# Patient Record
Sex: MALE | Race: BLACK OR AFRICAN AMERICAN | NOT HISPANIC OR LATINO | Employment: FULL TIME | URBAN - METROPOLITAN AREA
[De-identification: names, ages, dates, MRNs, and addresses within clinical notes are randomized per-mention and may not be internally consistent; named-entity substitution may affect disease eponyms.]

---

## 2022-09-09 ENCOUNTER — OFFICE VISIT (OUTPATIENT)
Dept: URGENT CARE | Facility: CLINIC | Age: 52
End: 2022-09-09
Payer: COMMERCIAL

## 2022-09-09 VITALS
HEART RATE: 82 BPM | TEMPERATURE: 98 F | SYSTOLIC BLOOD PRESSURE: 173 MMHG | WEIGHT: 208 LBS | DIASTOLIC BLOOD PRESSURE: 91 MMHG | OXYGEN SATURATION: 98 % | HEIGHT: 62 IN | BODY MASS INDEX: 38.28 KG/M2 | RESPIRATION RATE: 17 BRPM

## 2022-09-09 DIAGNOSIS — L25.9 CONTACT DERMATITIS, UNSPECIFIED CONTACT DERMATITIS TYPE, UNSPECIFIED TRIGGER: Primary | ICD-10-CM

## 2022-09-09 PROCEDURE — 3080F DIAST BP >= 90 MM HG: CPT | Mod: CPTII,,, | Performed by: FAMILY MEDICINE

## 2022-09-09 PROCEDURE — 3080F PR MOST RECENT DIASTOLIC BLOOD PRESSURE >= 90 MM HG: ICD-10-PCS | Mod: CPTII,,, | Performed by: FAMILY MEDICINE

## 2022-09-09 PROCEDURE — 3077F SYST BP >= 140 MM HG: CPT | Mod: CPTII,,, | Performed by: FAMILY MEDICINE

## 2022-09-09 PROCEDURE — 3008F PR BODY MASS INDEX (BMI) DOCUMENTED: ICD-10-PCS | Mod: CPTII,,, | Performed by: FAMILY MEDICINE

## 2022-09-09 PROCEDURE — 1159F MED LIST DOCD IN RCRD: CPT | Mod: CPTII,,, | Performed by: FAMILY MEDICINE

## 2022-09-09 PROCEDURE — 3008F BODY MASS INDEX DOCD: CPT | Mod: CPTII,,, | Performed by: FAMILY MEDICINE

## 2022-09-09 PROCEDURE — 99203 PR OFFICE/OUTPT VISIT, NEW, LEVL III, 30-44 MIN: ICD-10-PCS | Mod: ,,, | Performed by: FAMILY MEDICINE

## 2022-09-09 PROCEDURE — 1160F RVW MEDS BY RX/DR IN RCRD: CPT | Mod: CPTII,,, | Performed by: FAMILY MEDICINE

## 2022-09-09 PROCEDURE — 1160F PR REVIEW ALL MEDS BY PRESCRIBER/CLIN PHARMACIST DOCUMENTED: ICD-10-PCS | Mod: CPTII,,, | Performed by: FAMILY MEDICINE

## 2022-09-09 PROCEDURE — 3077F PR MOST RECENT SYSTOLIC BLOOD PRESSURE >= 140 MM HG: ICD-10-PCS | Mod: CPTII,,, | Performed by: FAMILY MEDICINE

## 2022-09-09 PROCEDURE — 1159F PR MEDICATION LIST DOCUMENTED IN MEDICAL RECORD: ICD-10-PCS | Mod: CPTII,,, | Performed by: FAMILY MEDICINE

## 2022-09-09 PROCEDURE — 99203 OFFICE O/P NEW LOW 30 MIN: CPT | Mod: ,,, | Performed by: FAMILY MEDICINE

## 2022-09-09 RX ORDER — HYDROCORTISONE 25 MG/G
CREAM TOPICAL
Qty: 20 G | Refills: 0 | Status: SHIPPED | OUTPATIENT
Start: 2022-09-09

## 2022-09-09 RX ORDER — TRIAMCINOLONE ACETONIDE 1 MG/G
CREAM TOPICAL 2 TIMES DAILY
Qty: 30 G | Refills: 0 | Status: SHIPPED | OUTPATIENT
Start: 2022-09-09 | End: 2022-09-16

## 2022-09-09 RX ORDER — FLUTICASONE FUROATE AND VILANTEROL TRIFENATATE 100; 25 UG/1; UG/1
POWDER RESPIRATORY (INHALATION)
COMMUNITY
Start: 2022-09-02

## 2022-09-09 RX ORDER — AMLODIPINE BESYLATE 5 MG/1
5 TABLET ORAL DAILY
COMMUNITY
Start: 2022-08-09

## 2022-09-09 RX ORDER — ALBUTEROL SULFATE 90 UG/1
AEROSOL, METERED RESPIRATORY (INHALATION)
COMMUNITY
Start: 2022-09-07

## 2022-09-09 RX ORDER — HYDROXYZINE HYDROCHLORIDE 25 MG/1
25 TABLET, FILM COATED ORAL 4 TIMES DAILY PRN
Qty: 20 TABLET | Refills: 0 | Status: SHIPPED | OUTPATIENT
Start: 2022-09-09 | End: 2022-09-14

## 2022-09-09 NOTE — PROGRESS NOTES
"Subjective:       Patient ID: Jose Eduardo Stern is a 52 y.o. male.    Vitals:  height is 5' 2" (1.575 m) and weight is 94.3 kg (208 lb). His oral temperature is 98.2 °F (36.8 °C). His blood pressure is 173/91 (abnormal) and his pulse is 82. His respiration is 17 and oxygen saturation is 98%.     Chief Complaint: Poison Ivy (Possible* x 2 days ago )    52 y.o. presents to clinic stating he has rashes on face and arms. 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 OTC ointment used worsen .  Is diabetic.  Last A1c was over 8.  A blood glucose was checked it was 245.  Explained to patient that his blood sugars too high for any oral steroid use or steroid injection.    Constitution: Negative.   HENT: Negative.     Neck: neck negative.   Cardiovascular: Negative.    Eyes: Negative.    Respiratory: Negative.  Negative for chest tightness, shortness of breath and wheezing.    Gastrointestinal: Negative.    Genitourinary: Negative.    Musculoskeletal: Negative.    Skin:  Positive for rash.   Allergic/Immunologic: Negative.    Neurological: Negative.    Hematologic/Lymphatic: Negative.      Objective:      Physical Exam   Constitutional:  Non-toxic appearance. He does not appear ill. No distress.   HENT:   Head: Normocephalic and atraumatic.   Eyes: Right eye exhibits no discharge (there is bilateral swelling of the upper and lower eyelids.  There is erythema of the surrounding skin.  Lips tongue normal). Left eye exhibits no discharge.   Pulmonary/Chest: Effort normal.   Abdominal: Normal appearance.   Neurological: He is alert.   Skin: Skin is not diaphoretic and rash (erythemic macular rash on the bilateral extremities).   Vitals reviewed.      Assessment:       1. Contact dermatitis, unspecified contact dermatitis type, unspecified trigger          Plan:       Medications sent to pharmacy.  Hydroxyzine helps with itching.  It may cause drowsiness.  Not drink alcohol or drive when taking it. "  He can take Claritin or Zyrtec during the day time.  Only use the hydrocortisone cream prescribed on the face.  Use the least amount possible for the shortest duration.  The other steroid cream can be used on the arms legs and torso.  Follow-up with your family doctor to discuss your blood sugar and medications.  Contact dermatitis, unspecified contact dermatitis type, unspecified trigger    Other orders  -     hydrOXYzine HCL (ATARAX) 25 MG tablet; Take 1 tablet (25 mg total) by mouth 4 (four) times daily as needed for Itching.  Dispense: 20 tablet; Refill: 0  -     hydrocortisone 2.5 % cream; Apply thin layer to affected areas on face q12 x 7 days. Use the least amount possible for the shortest duration possible  Dispense: 20 g; Refill: 0  -     triamcinolone acetonide 0.1% (KENALOG) 0.1 % cream; Apply topically 2 (two) times daily. for 7 days  Dispense: 30 g; Refill: 0

## 2022-09-09 NOTE — PATIENT INSTRUCTIONS
Medications sent to pharmacy.  Hydroxyzine helps with itching.  It may cause drowsiness.  Not drink alcohol or drive when taking it.  He can take Claritin or Zyrtec during the day time.  Only use the hydrocortisone cream prescribed on the face.  Use the least amount possible for the shortest duration.  The other steroid cream can be used on the arms legs and torso.  Follow-up with your family doctor to discuss your blood sugar and medications.